# Patient Record
Sex: FEMALE | Race: BLACK OR AFRICAN AMERICAN | NOT HISPANIC OR LATINO | Employment: UNEMPLOYED | URBAN - METROPOLITAN AREA
[De-identification: names, ages, dates, MRNs, and addresses within clinical notes are randomized per-mention and may not be internally consistent; named-entity substitution may affect disease eponyms.]

---
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
## 2024-08-20 ENCOUNTER — OFFICE VISIT (OUTPATIENT)
Dept: OBSTETRICS AND GYNECOLOGY | Facility: CLINIC | Age: 19
End: 2024-08-20
Payer: MEDICAID

## 2024-08-20 VITALS
SYSTOLIC BLOOD PRESSURE: 108 MMHG | DIASTOLIC BLOOD PRESSURE: 69 MMHG | WEIGHT: 131.25 LBS | HEIGHT: 63 IN | HEART RATE: 81 BPM | BODY MASS INDEX: 23.25 KG/M2

## 2024-08-20 DIAGNOSIS — Z00.00 WELL WOMAN EXAM (NO GYNECOLOGICAL EXAM): Primary | ICD-10-CM

## 2024-08-20 DIAGNOSIS — R23.3 EASY BRUISING: ICD-10-CM

## 2024-08-20 DIAGNOSIS — Z72.51 HIGH RISK SEXUAL BEHAVIOR, UNSPECIFIED TYPE: ICD-10-CM

## 2024-08-20 NOTE — PROGRESS NOTES
"    Subjective:       Patient ID: Moraima Marte is a 18 y.o. female.    Chief Complaint:  Well Woman      History of Present Illness   Presents for annual gyn exam. History and past labs reviewed with patient.    Complains of easy bruising  Sexually active not using condoms  Not using contraception   Patient's last menstrual period was 2024.    OB History          0    Para   0    Term   0       0    AB   0    Living   0         SAB   0    IAB   0    Ectopic   0    Multiple   0    Live Births   0                  Review of Systems  Review of Systems   Constitutional:  Negative for appetite change and fatigue.   HENT:  Negative for tinnitus.    Cardiovascular:  Negative for chest pain and palpitations.   Gastrointestinal:  Negative for abdominal pain, bloating, constipation, vomiting and reflux.   Endocrine: Negative for diabetes, hyperthyroidism and hypothyroidism.   Genitourinary:  Negative for flank pain, menstrual problem, pelvic pain and postcoital bleeding.   Musculoskeletal:  Negative for back pain and myalgias.   Integumentary:  Negative for rash, mole/lesion, breast mass and breast tenderness.   Neurological:  Negative for vertigo, syncope and headaches.   Psychiatric/Behavioral:  Negative for depression and sleep disturbance. The patient is not nervous/anxious.    Breast: Negative for lump, mass, mastodynia and tenderness          Objective:     Vitals:    24 1108   BP: 108/69   Pulse: 81   Weight: 59.5 kg (131 lb 4 oz)   Height: 5' 3" (1.6 m)        Physical Exam:   Constitutional: She is oriented to person, place, and time. She appears well-developed and well-nourished.    HENT:   Head: Normocephalic and atraumatic.     Neck: No thyromegaly present.    Cardiovascular:  Normal rate, regular rhythm and normal heart sounds.             Pulmonary/Chest: Effort normal and breath sounds normal. No respiratory distress.        Abdominal: Soft. There is no abdominal tenderness.        "      Musculoskeletal: Moves all extremeties.       Neurological: She is alert and oriented to person, place, and time.    Skin: Skin is warm and dry.    Psychiatric: She has a normal mood and affect. Her behavior is normal. Judgment and thought content normal.          Assessment:     1. Well woman exam (no gynecological exam)    2. High risk sexual behavior, unspecified type    3. Easy bruising              Plan:       Well woman exam (no gynecological exam)  -     CBC auto differential; Future; Expected date: 08/20/2024    High risk sexual behavior, unspecified type  -     C. trachomatis/N. gonorrhoeae by AMP DNA Other; Urine; Future; Expected date: 08/20/2024  -     Trichomonas Vaginalis, NAIL; Future; Expected date: 08/20/2024    Easy bruising  -     CBC auto differential; Future; Expected date: 08/20/2024    Safe sex practices    OTC Women's daily Multi vitamin  Healthy diet, exercise and lifestyle discussed  gardasil discussed   Risk assessment for inherited gyn cancer done   Patient was counseled today on A.C.S. Pap guidelines and recommendations for yearly pelvic exams, mammograms and monthly self breast exams; to see her PCP for other health maintenance.     Follow up in about 1 year (around 8/20/2025).
